# Patient Record
Sex: MALE | Race: WHITE | HISPANIC OR LATINO | Employment: FULL TIME | URBAN - METROPOLITAN AREA
[De-identification: names, ages, dates, MRNs, and addresses within clinical notes are randomized per-mention and may not be internally consistent; named-entity substitution may affect disease eponyms.]

---

## 2022-08-14 ENCOUNTER — HOSPITAL ENCOUNTER (EMERGENCY)
Facility: HOSPITAL | Age: 22
Discharge: HOME | End: 2022-08-14
Attending: EMERGENCY MEDICINE | Admitting: EMERGENCY MEDICINE
Payer: COMMERCIAL

## 2022-08-14 VITALS
DIASTOLIC BLOOD PRESSURE: 82 MMHG | SYSTOLIC BLOOD PRESSURE: 131 MMHG | HEIGHT: 71 IN | RESPIRATION RATE: 18 BRPM | TEMPERATURE: 97.6 F | WEIGHT: 257 LBS | HEART RATE: 70 BPM | BODY MASS INDEX: 35.98 KG/M2 | OXYGEN SATURATION: 100 %
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
## 2022-08-14 DIAGNOSIS — S31.159A DOG BITE OF ABDOMEN: Primary | ICD-10-CM

## 2022-08-14 PROCEDURE — 99284 EMERGENCY DEPT VISIT MOD MDM: CPT | Performed by: EMERGENCY MEDICINE

## 2022-08-14 PROCEDURE — 90715 TDAP VACCINE 7 YRS/> IM: CPT | Performed by: EMERGENCY MEDICINE

## 2022-08-14 PROCEDURE — 99283 EMERGENCY DEPT VISIT LOW MDM: CPT

## 2022-08-14 PROCEDURE — 90471 IMMUNIZATION ADMIN: CPT

## 2022-08-14 RX ORDER — AMOXICILLIN AND CLAVULANATE POTASSIUM 875; 125 MG/1; MG/1
1 TABLET, FILM COATED ORAL EVERY 12 HOURS
Qty: 14 TABLET | Refills: 0 | Status: SHIPPED | OUTPATIENT
Start: 2022-08-14 | End: 2022-08-21

## 2022-08-14 RX ORDER — GINSENG 100 MG
1 CAPSULE ORAL ONCE
Status: COMPLETED | OUTPATIENT
Start: 2022-08-14 | End: 2022-08-14

## 2022-08-14 RX ORDER — IBUPROFEN 600 MG/1
600 TABLET ORAL ONCE
Status: COMPLETED | OUTPATIENT
Start: 2022-08-14 | End: 2022-08-14

## 2022-08-14 RX ORDER — IBUPROFEN 400 MG/1
400 TABLET ORAL EVERY 8 HOURS PRN
Qty: 20 TABLET | Refills: 0 | Status: SHIPPED | OUTPATIENT
Start: 2022-08-14

## 2022-08-14 RX ORDER — AMOXICILLIN AND CLAVULANATE POTASSIUM 875; 125 MG/1; MG/1
1 TABLET, FILM COATED ORAL ONCE
Status: COMPLETED | OUTPATIENT
Start: 2022-08-14 | End: 2022-08-14

## 2022-08-14 RX ORDER — ACETAMINOPHEN 325 MG/1
650 TABLET ORAL ONCE
Status: COMPLETED | OUTPATIENT
Start: 2022-08-14 | End: 2022-08-14

## 2022-08-14 RX ORDER — ACETAMINOPHEN 325 MG/1
650 TABLET ORAL EVERY 6 HOURS PRN
Qty: 40 TABLET | Refills: 0 | Status: SHIPPED | OUTPATIENT
Start: 2022-08-14

## 2022-08-14 RX ORDER — GINSENG 100 MG
1 CAPSULE ORAL 2 TIMES DAILY
Qty: 28 G | Refills: 0 | Status: SHIPPED | OUTPATIENT
Start: 2022-08-14

## 2022-08-14 RX ADMIN — IBUPROFEN 600 MG: 600 TABLET, FILM COATED ORAL at 15:40

## 2022-08-14 RX ADMIN — ACETAMINOPHEN 650 MG: 325 TABLET, FILM COATED ORAL at 15:39

## 2022-08-14 RX ADMIN — AMOXICILLIN AND CLAVULANATE POTASSIUM 1 TABLET: 875; 125 TABLET, FILM COATED ORAL at 15:40

## 2022-08-14 RX ADMIN — TETANUS TOXOID, REDUCED DIPHTHERIA TOXOID AND ACELLULAR PERTUSSIS VACCINE, ADSORBED 0.5 ML: 5; 2.5; 8; 8; 2.5 SUSPENSION INTRAMUSCULAR at 15:42

## 2022-08-14 RX ADMIN — BACITRACIN ZINC 1 SMALL APPLICATION: 500 OINTMENT TOPICAL at 15:40

## 2022-08-14 NOTE — Clinical Note
Dariel Morrow was seen and treated in our emergency department on 8/14/2022  Diagnosis: Dog bite    Tor Plummer  may return to work on return date  He may return on this date: 08/15/2022         If you have any questions or concerns, please don't hesitate to call        Tobias Kim MD    ______________________________           _______________          _______________  Hospital Representative                              Date                                Time

## 2022-08-14 NOTE — ED PROVIDER NOTES
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, states that he went and packed the family's daughter when the dog that was previously parking got between them and bit at his abdomen  The dog is up-to-date on immunizations, was acting normally before and afterwards, is currently under observation at the family's home  He complains of pain to the site which is dull, moderate intensity, in the skin on the from the abdomen, nonradiating, worse with movement or pressing on the area and better at rest   He denies any other pain or injuries  HPI      Past Medical and Surgical History    History reviewed  No pertinent past medical history  History reviewed  No pertinent surgical history  History reviewed  No pertinent family history  Allergies    No Known Allergies    Home Medications    Prior to Admission medications    Not on File           Review of Systems    Review of Systems   Constitutional: Negative for appetite change, chills and diaphoresis  HENT: Negative for drooling, facial swelling, trouble swallowing and voice change  Respiratory: Negative for apnea, shortness of breath and wheezing  Cardiovascular: Negative for chest pain and leg swelling  Gastrointestinal: Negative for abdominal distention, abdominal pain, diarrhea, nausea and vomiting  Genitourinary: Negative for dysuria and urgency  Musculoskeletal: Negative for arthralgias, back pain, gait problem and neck pain  Skin: Positive for wound  Negative for color change and rash  Neurological: Negative for seizures, speech difficulty, weakness and headaches  Psychiatric/Behavioral: Negative for agitation, behavioral problems and dysphoric mood  The patient is not nervous/anxious  All other systems reviewed and negative  Physical Exam      ED Triage Vitals [08/14/22 1507]   Temperature Pulse Respirations Blood Pressure SpO2   97 6 °F (36 4 °C) 70 18 131/82 100 %      Temp Source Heart Rate Source Patient Position - Orthostatic VS BP Location FiO2 (%)   Tympanic -- -- -- --      Pain Score       --               Physical Exam  Vitals and nursing note reviewed  Constitutional:       General: He is not in acute distress  Appearance: He is well-developed  He is not ill-appearing, toxic-appearing or diaphoretic  HENT:      Head: Normocephalic and atraumatic  Right Ear: External ear normal       Left Ear: External ear normal    Eyes:      Conjunctiva/sclera: Conjunctivae normal       Pupils: Pupils are equal, round, and reactive to light  Neck:      Trachea: No tracheal deviation  Cardiovascular:      Rate and Rhythm: Normal rate and regular rhythm  Heart sounds: Normal heart sounds  No murmur heard  Pulmonary:      Effort: Pulmonary effort is normal  No respiratory distress  Breath sounds: Normal breath sounds  No stridor  No wheezing or rales  Abdominal:      General: There is no distension  Palpations: Abdomen is soft  Tenderness: There is abdominal tenderness  There is no guarding or rebound  Comments: Three small abrasions as well as 1 bruising pattern consistent with dog bite noted to the right upper quadrant of the abdomen  Small bruising noted as well  Lacerations explored to base in bloodless field, although shallow abrasions, not amenable to repair, no evidence of deeper puncture  No foreign objects seen  Musculoskeletal:         General: No deformity  Normal range of motion  Cervical back: Normal range of motion and neck supple  Skin:     General: Skin is warm and dry  Findings: No rash  Neurological:      Mental Status: He is alert and oriented to person, place, and time  Psychiatric:         Behavior: Behavior normal          Thought Content: Thought content normal          Judgment: Judgment normal               Diagnostic Results      Labs:    Results Reviewed     None          All labs reviewed and utilized in the medical decision making process    Radiology:    No orders to display       All radiology studies independently viewed by me and interpreted by the radiologist     Procedure    Procedures        ED Course of Care and Re-Assessments      Rabies prophylaxis not indicated, wound cleansed and dressed, started on Augmentin, given Tylenol Motrin for pain, tetanus updated  Medications   bacitracin topical ointment 1 small application (1 small application Topical Given 8/14/22 1540)   amoxicillin-clavulanate (AUGMENTIN) 875-125 mg per tablet 1 tablet (1 tablet Oral Given 8/14/22 1540)   ibuprofen (MOTRIN) tablet 600 mg (600 mg Oral Given 8/14/22 1540)   acetaminophen (TYLENOL) tablet 650 mg (650 mg Oral Given 8/14/22 1539)   tetanus-diphtheria-acellular pertussis (BOOSTRIX) IM injection 0 5 mL (0 5 mL Intramuscular Given 8/14/22 1542)           FINAL IMPRESSION    Final diagnoses:   Dog bite of abdomen         DISPOSITION/PLAN    Presentation as above with dog of the abdomen  Vital signs reassuring, examination likewise reassuring  Fortunately wound is relatively minor, does not appear to penetrate into the peritoneal cavity, patient was offered a CT scan with concern for possible occult penetrating injury but after discussion of risks and benefits patient declined  Rabies prophylaxis not indicated at this time    Counseled regarding diagnosis as well as management at home, counseled return for rabies vaccine if dog begins behaving oddly, given strict return precautions  Follow up primary care doctor  Time reflects when diagnosis was documented in both MDM as applicable and the Disposition within this note     Time User Action Codes Description Comment    8/14/2022  3:43 PM Yasir Beal [S31 159A,  W54  0XXA] Dog bite of abdomen       ED Disposition     ED Disposition   Discharge    Condition   Stable    Date/Time   Sun Aug 14, 2022  3:43 PM    Comment   Sumi Forward discharge to home/self care  Follow-up Information     Follow up With Specialties Details Why Contact Info Additional [address] [facility] Emergency Department Emergency Medicine Go to  If symptoms worsen [address] [facility], [address] [place], [place], [address] [facility] Internal Medicine Call in 1 day To schedule close outpatient follow-up for wound check in 48-72 hours  [address]/[facility] [address]  [phone]               PATIENT REFERRED TO:    [address] [facility] Emergency Department  [address] [id number] [phone]  Go to   If symptoms worsen    Rashid Hernandez DO  [address]/[place] [address]  [phone]    Call in 1 day  To schedule close outpatient follow-up for wound check in 48-72 hours        DISCHARGE MEDICATIONS:    Discharge Medication List as of 8/14/2022  3:44 PM      START taking these medications    Details   acetaminophen (TYLENOL) 325 mg tablet Take 2 tablets (650 mg total) by mouth every 6 (six) hours as needed for mild pain, Starting Sun 8/14/2022, Print      amoxicillin-clavulanate (AUGMENTIN) 875-125 mg per tablet Take 1 tablet by mouth every 12 (twelve) hours for 7 days, Starting Sun 8/14/2022, Until Sun 8/21/2022, Print      bacitracin topical ointment 500 units/g topical ointment Apply 1 large application topically 2 (two) times a day, Starting Sun 8/14/2022, Print      ibuprofen (MOTRIN) 400 mg tablet Take 1 tablet (400 mg total) by mouth every 8 (eight) hours as needed for moderate pain, Starting Sun 8/14/2022, Print             No discharge procedures on file  Randi Noonan MD    Portions of the record may have been created with voice recognition software  Occasional wrong word or "sound alike" substitutions may have occurred due to the inherent limitations of voice recognition software    Please read the chart carefully and recognize, using context, where substitutions have occurred     Randi Noonan MD  08/14/22 9928